# Patient Record
Sex: FEMALE | Race: ASIAN | Employment: FULL TIME | URBAN - METROPOLITAN AREA
[De-identification: names, ages, dates, MRNs, and addresses within clinical notes are randomized per-mention and may not be internally consistent; named-entity substitution may affect disease eponyms.]

---

## 2021-10-16 ENCOUNTER — OFFICE VISIT (OUTPATIENT)
Dept: URGENT CARE | Facility: URGENT CARE | Age: 36
End: 2021-10-16
Payer: COMMERCIAL

## 2021-10-16 VITALS
DIASTOLIC BLOOD PRESSURE: 119 MMHG | SYSTOLIC BLOOD PRESSURE: 198 MMHG | WEIGHT: 175 LBS | BODY MASS INDEX: 29.88 KG/M2 | OXYGEN SATURATION: 97 % | HEIGHT: 64 IN | HEART RATE: 80 BPM

## 2021-10-16 DIAGNOSIS — M79.89 SWELLING OF FINGER, LEFT: Primary | ICD-10-CM

## 2021-10-16 PROCEDURE — 99203 OFFICE O/P NEW LOW 30 MIN: CPT | Performed by: PHYSICIAN ASSISTANT

## 2021-10-16 ASSESSMENT — MIFFLIN-ST. JEOR: SCORE: 1468.79

## 2021-10-16 NOTE — PATIENT INSTRUCTIONS
(M79.89) Swelling of finger, left  (primary encounter diagnosis)  Comment: ring stuck, unable to remove in clinic with 2 methods.    Plan: Patient to seek further assistance at [facility] down the street, they have an electric ring cutter and will remove it free of charge for her.

## 2021-10-16 NOTE — PROGRESS NOTES
"Patient presents with:  Urgent Care: Pt in clinic to have eval fo left hand ring finger swelling.  Pt can't get wedding ring off.  Finger    (M79.89) Swelling of finger, left  (primary encounter diagnosis)  Comment: ring stuck, unable to remove in clinic with 2 methods.    Plan: Patient to seek further assistance at [facility] down the street, they have an electric ring cutter and will remove it free of charge for her.          SUBJECTIVE:   Gin Edwards is a 35 year old female who presents with sudden onset of finger swelling today, no known trauma.  Unable to remove white gold ring.        No past medical history on file.      Current Outpatient Medications   Medication Sig Dispense Refill     Multiple Vitamins-Iron (DAILY-THIERNO/IRON/BETA-CAROTENE) TABS TAKE 1 TABLET BY MOUTH DAILY. (Patient not taking: Reported on 10/19/2020) 30 tablet 7     Social History     Tobacco Use     Smoking status: Never Smoker     Smokeless tobacco: Never Used   Substance Use Topics     Alcohol use: Not on file     Family History   Problem Relation Age of Onset     Diabetes Mother      Diabetes Father          ROS:    10 point ROS of systems including Constitutional, Eyes, Respiratory, Cardiovascular, Gastroenterology, Genitourinary, Integumentary, Muscularskeletal, Psychiatric ,neurological were all negative except for pertinent positives noted in my HPI       OBJECTIVE:  BP (!) 198/119   Pulse 80   Ht 1.626 m (5' 4\")   Wt 79.4 kg (175 lb)   SpO2 97%   BMI 30.04 kg/m    Physical Exam:  GENERAL APPEARANCE: healthy, alert and no distress  Extremities: left 4th finger with swelling distal to ring.   NEURO: Normal strength and tone, sensory exam grossly normal,  normal speech and mentation  SKIN: no suspicious lesions or rashes  VASC: cap refill is somewhat sluggish at 3 seconds.      PROCEDURE by me in clinic:  Attempted removal with dental floss wrapped around distal aspect of finger without relief.    Ring cutter " blade did not work on ring either (made only small indent after 20 minutes of turning blade)  Finger was also iced with ice pack without relief of swelling